# Patient Record
Sex: MALE | Race: WHITE | Employment: OTHER | URBAN - METROPOLITAN AREA
[De-identification: names, ages, dates, MRNs, and addresses within clinical notes are randomized per-mention and may not be internally consistent; named-entity substitution may affect disease eponyms.]

---

## 2022-08-09 ENCOUNTER — OFFICE VISIT (OUTPATIENT)
Dept: ORTHOPEDIC SURGERY | Age: 57
End: 2022-08-09
Payer: MEDICARE

## 2022-08-09 VITALS — WEIGHT: 186 LBS

## 2022-08-09 DIAGNOSIS — M25.561 CHRONIC PAIN OF RIGHT KNEE: ICD-10-CM

## 2022-08-09 DIAGNOSIS — M76.891 PES ANSERINUS TENDONITIS OF RIGHT LOWER EXTREMITY: Primary | ICD-10-CM

## 2022-08-09 DIAGNOSIS — G89.29 CHRONIC PAIN OF RIGHT KNEE: ICD-10-CM

## 2022-08-09 PROCEDURE — 99214 OFFICE O/P EST MOD 30 MIN: CPT | Performed by: ORTHOPAEDIC SURGERY

## 2022-08-09 PROCEDURE — 3017F COLORECTAL CA SCREEN DOC REV: CPT | Performed by: ORTHOPAEDIC SURGERY

## 2022-08-09 PROCEDURE — G8421 BMI NOT CALCULATED: HCPCS | Performed by: ORTHOPAEDIC SURGERY

## 2022-08-09 PROCEDURE — 20610 DRAIN/INJ JOINT/BURSA W/O US: CPT | Performed by: ORTHOPAEDIC SURGERY

## 2022-08-09 PROCEDURE — G8428 CUR MEDS NOT DOCUMENT: HCPCS | Performed by: ORTHOPAEDIC SURGERY

## 2022-08-09 PROCEDURE — 4004F PT TOBACCO SCREEN RCVD TLK: CPT | Performed by: ORTHOPAEDIC SURGERY

## 2022-08-09 RX ORDER — ATORVASTATIN CALCIUM 20 MG/1
TABLET, FILM COATED ORAL
COMMUNITY
Start: 2022-07-12

## 2022-08-09 RX ORDER — TIZANIDINE 4 MG/1
4 TABLET ORAL 2 TIMES DAILY PRN
COMMUNITY
Start: 2022-07-12 | End: 2023-07-12

## 2022-08-09 RX ORDER — DEXLANSOPRAZOLE 60 MG/1
CAPSULE, DELAYED RELEASE ORAL
COMMUNITY
Start: 2022-07-15

## 2022-08-09 RX ORDER — NAPROXEN 500 MG/1
TABLET ORAL
COMMUNITY
Start: 2022-07-12

## 2022-08-09 RX ORDER — PREDNISONE 1 MG/1
TABLET ORAL
COMMUNITY
Start: 2022-07-26

## 2022-08-09 RX ORDER — SUMATRIPTAN 25 MG/1
TABLET, FILM COATED ORAL
COMMUNITY
Start: 2022-07-12

## 2022-08-09 RX ORDER — APIXABAN 5 MG/1
TABLET, FILM COATED ORAL
COMMUNITY
Start: 2022-07-12

## 2022-08-09 RX ORDER — GABAPENTIN 300 MG/1
CAPSULE ORAL
COMMUNITY
Start: 2022-07-12

## 2022-08-09 RX ORDER — CELECOXIB 200 MG/1
200 CAPSULE ORAL DAILY
COMMUNITY
Start: 2022-07-26 | End: 2023-07-26

## 2022-08-09 RX ORDER — TRAZODONE HYDROCHLORIDE 100 MG/1
TABLET ORAL
COMMUNITY
Start: 2022-07-12

## 2022-08-09 RX ORDER — SERTRALINE HYDROCHLORIDE 100 MG/1
TABLET, FILM COATED ORAL
COMMUNITY
Start: 2022-05-23

## 2022-08-09 RX ORDER — TRIAMCINOLONE ACETONIDE 40 MG/ML
40 INJECTION, SUSPENSION INTRA-ARTICULAR; INTRAMUSCULAR ONCE
Status: COMPLETED | OUTPATIENT
Start: 2022-08-09 | End: 2022-08-09

## 2022-08-09 RX ORDER — CYCLOBENZAPRINE HCL 10 MG
TABLET ORAL
COMMUNITY
Start: 2022-07-12

## 2022-08-09 RX ADMIN — TRIAMCINOLONE ACETONIDE 40 MG: 40 INJECTION, SUSPENSION INTRA-ARTICULAR; INTRAMUSCULAR at 11:36

## 2022-08-09 NOTE — PROGRESS NOTES
Name: Keya Zaidi  YOB: 1965  Gender: male  MRN: [id number]      CC: Knee Pain (Right knee)       HPI: Keya Zaidi is a 55 y.o. male who presents with Knee Pain (Right knee)  . Mr. Nugent is a known patient to me who underwent ACL, MCL, medial meniscus repair to treat a right knee dislocation in January 2020. He reports that about a year ago he started having right knee pain and was seeking treatment with Dr. Rodriguez Chris at [facility]. He has had multiple steroid injections which have not given him much of any relief. An MRI was recently ordered and he is here for follow-up and treatment. Physical Examination:  General: no acute distress  Lungs: breathing easily  CV: regular rhythm by pulse  Right Knee: Focal tenderness palpation over the pes tendons and the pes bursa and potentially over the tibial button of the ACL. He also has mild tenderness palpation of the medial joint line. Pain at the extremes of motion. He has a solid endpoint with minimal translation on his Lachman's. No side substantial laxity appreciated to valgus stress at 0 or 30 degrees. He has some mild discomfort with Per's over the medial joint line with a trace effusion today. Imaging:   I reviewed plain radiographs of his right knee from May 17, 2022 which show ACL and MCL reconstruction with hardware and tunnels to be in appropriate position. There is some ossification of the MCL tissue just below the joint line with some mild medial compartment joint space narrowing. Also reviewed an MRI scan of his right knee from June 23, 2022 which shows postsurgical changes consistent with ACL reconstruction and MCL reconstruction. ACL graft tissue appears to be intact. Some mild irregularity potentially. There appears to be some horizontal tearing of the posterior horn of the medial meniscus  All imaging interpreted by myself Pardeep Perea MD independent of radiology review        Assessment:     ICD-10-CM    1. Pes anserinus tendonitis of right lower extremity  M76.891       2. Chronic pain of right knee  M25.561 IN ARTHROCENTESIS ASPIR&/INJ MAJOR JT/BURSA W/O US    G89.29 triamcinolone acetonide (KENALOG-40) injection 40 mg     IN ARTHROCENTESIS ASPIR&/INJ MAJOR JT/BURSA W/O US     triamcinolone acetonide (KENALOG-40) injection 40 mg     Amb External Referral To Physical Therapy          Plan:   I think he has multiple sources of potential pain. He could have a recurrent medial meniscal tear although this does not appear to explain all of his symptoms. We discussed an intra-articular injection versus knee arthroscopy. Also think he does have some pes tendinitis and potentially saphenous nerve irritation as well as potential hardware prominence and pain although the button is flush with the medial cortex. We discussed consideration of knee arthroscopy with meniscus debridement and removal of the tibial tight rope button. He prefers to avoid surgical intervention. I do think a lot of his symptoms can be from deconditioning and he can improve with quad strengthening and patellofemoral tracking. Physical therapy was ordered for treatment of that today. We also elected to proceed with an injection into the joint as well as the Pez bursa of the area of maximal tenderness. We will see him back in 6 weeks if is not improving we will consider surgical intervention. The patient elected to proceed with an intraarticular knee injection today. After verbal informed consent was obtained after sterile prep the Right knee  was injected from a anterior lateral approach with 4 cc of 0.25% Marcaine and 1 cc of 40 mg Kenalog. The patient tolerated the procedure well was given postinjection flare precautions. After informed verbal consent was obtained the area of maximal tenderness over the pes bursa was injected with 3 cc of 0.25% Marcaine and 1 cc of 40 mg Kenalog.   The patient tolerated the injection well and was given post injection flare precautions. Bianka Powell MD, [facility]